# Patient Record
Sex: FEMALE | Race: WHITE
[De-identification: names, ages, dates, MRNs, and addresses within clinical notes are randomized per-mention and may not be internally consistent; named-entity substitution may affect disease eponyms.]

---

## 2018-08-13 NOTE — EDM.PDOC
ED HPI GENERAL MEDICAL PROBLEM





- General


Chief Complaint: Gastrointestinal Problem


Stated Complaint: 9 WEEKS PREG AND VOMITING


Time Seen by Provider: 08/13/18 07:54


Source of Information: Reports: Patient, RN Notes Reviewed





- History of Present Illness


INITIAL COMMENTS - FREE TEXT/NARRATIVE: 





28-year-old female comes in with symptoms of nausea vomiting and stating that 

she is about 9 weeks pregnant. At this time she has no major abdominal pain or 

cramping. No vaginal bleeding or spotting at this time. She states her mouth 

does feel dry this morning and she does feel somewhat weak and lightheaded when 

standing or walking. She has been taking some Reglan intermittently to help 

with the nausea vomiting.





- Related Data


 Allergies











Allergy/AdvReac Type Severity Reaction Status Date / Time


 


No Known Allergies Allergy   Verified 08/13/18 07:41











Home Meds: 


 Home Meds





Metoclopramide [Reglan] 2 tsp PO Q6H 08/13/18 [History]











Past Medical History


Gastrointestinal History: Reports: Other (See Below)


Other Gastrointestinal History: hyperemesis


OB/GYN History: Reports: Pregnancy





Social & Family History





- Tobacco Use


Smoking Status *Q: Never Smoker





- Recreational Drug Use


Recreational Drug Use: No





ED ROS GENERAL





- Review of Systems


Review Of Systems: See Below


Constitutional: Denies: Fever, Chills, Diaphoresis


HEENT: Denies: Throat Pain


Respiratory: Denies: Shortness of Breath


Cardiovascular: Denies: Chest Pain


GI/Abdominal: Reports: Nausea, Vomiting.  Denies: Abdominal Pain, Diarrhea


Musculoskeletal: Reports: No Symptoms


Skin: Reports: No Symptoms


Neurological: Reports: Dizziness





ED EXAM PREGNANCY





- Physical Exam


Exam: See Below


General Appearance: Alert, No Apparent Distress


Throat/Mouth: Other (oral mucosa mildly dry)


Head: Atraumatic


Neck: Supple, Full Range of Motion


Respiratory/Chest: No Respiratory Distress, Lungs Clear, Normal Breath Sounds


Cardiovascular: Tachycardia


GI/Abdominal Exam: Soft, Non-Tender.  No: Guarding


Extremities: Normal Inspection


Neurological: Alert, Oriented, No Motor/Sensory Deficits


Skin Exam: Warm, Dry, Normal Color





Course





- Vital Signs


Last Recorded V/S: 


 Last Vital Signs











Temp  98.5 F   08/13/18 07:38


 


Pulse  141 H  08/13/18 07:38


 


Resp  20   08/13/18 07:38


 


BP  129/94 H  08/13/18 07:38


 


Pulse Ox  100   08/13/18 07:38














- Orders/Labs/Meds


Orders: 


 Active Orders 24 hr











 Category Date Time Status


 


 Peripheral IV Care [RC] .AS DIRECTED Care  08/13/18 08:00 Active


 


 UA W/MICROSCOPIC [URIN] Stat Lab  08/13/18 09:12 Ordered


 


 Potassium Chloride [KCl 10 MEQ in Water 100 ML] 10 meq Med  08/13/18 09:16 

Active





 Premix Bag 1 bag   





 IV ASDIRECTED   


 


 Sodium Chloride 0.9% [Normal Saline] 1,000 ml Med  08/13/18 08:00 Active





 IV ONETIME   


 


 Sodium Chloride 0.9% [Saline Flush] Med  08/13/18 08:00 Active





 10 ml FLUSH ASDIRECTED PRN   


 


 Peripheral IV Insertion Adult [OM.PC] Stat Oth  08/13/18 08:00 Ordered








 Medication Orders





Sodium Chloride (Normal Saline)  1,000 mls @ 999 mls/hr IV ONETIME ELINA


   Last Admin: 08/13/18 08:07  Dose: 999 mls/hr


Potassium Chloride 10 meq/ (Premix)  100 mls @ 50 mls/hr IV ASDIRECTED ONE


   Stop: 08/13/18 11:15


   Last Admin: 08/13/18 09:20 Dose:  Not Given


Sodium Chloride (Saline Flush)  10 ml FLUSH ASDIRECTED PRN


   PRN Reason: Keep Vein Open


   Last Admin: 08/13/18 08:07  Dose: 10 ml








Labs: 


 Laboratory Tests











  08/13/18 08/13/18 08/13/18 Range/Units





  07:50 07:50 07:50 


 


WBC  13.56 H    (3.98-10.04)  K/mm3


 


RBC  5.49 H    (3.98-5.22)  M/mm3


 


Hgb  15.5    (11.2-15.7)  gm/L


 


Hct  44.0    (34.1-44.9)  %


 


MCV  80.1    (79.4-94.8)  fl


 


MCH  28.2    (25.6-32.2)  pg


 


MCHC  35.2    (32.2-35.5)  g/dl


 


RDW Std Deviation  37.7    (36.4-46.3)  fL


 


Plt Count  317    (182-369)  K/mm3


 


MPV  12.7 H    (9.4-12.3)  fl


 


Neut % (Auto)  81.0 H    (34.0-71.1)  %


 


Lymph % (Auto)  10.9 L    (19.3-51.7)  %


 


Mono % (Auto)  7.3    (4.7-12.5)  %


 


Eos % (Auto)  0.3 L    (0.7-5.8)  


 


Baso % (Auto)  0.1    (0.1-1.2)  %


 


Neut # (Auto)  10.97 H    (1.56-6.13)  K/mm3


 


Lymph # (Auto)  1.48    (1.18-3.74)  K/mm3


 


Mono # (Auto)  0.99 H    (0.24-0.36)  K/mm3


 


Eos # (Auto)  0.04    (0.04-0.36)  K/mm3


 


Baso # (Auto)  0.02    (0.01-0.08)  K/mm3


 


Sodium   136   (136-145)  mEq/L


 


Potassium   2.6 L   (3.5-5.1)  mEq/L


 


Chloride   97 L   ()  mEq/L


 


Carbon Dioxide   22   (21-32)  mEq/L


 


Anion Gap   19.6 H   (5-15)  


 


BUN   22 H   (7-18)  mg/dL


 


Creatinine   1.5 H   (0.55-1.02)  mg/dL


 


Est Cr Clr Drug Dosing   44.95   mL/min


 


Estimated GFR (MDRD)   42   (>60)  mL/min


 


BUN/Creatinine Ratio   14.7   (14-18)  


 


Glucose   129 H   ()  mg/dL


 


Calcium   9.3   (8.5-10.1)  mg/dL


 


Total Bilirubin   1.2 H   (0.2-1.0)  mg/dL


 


AST   56 H   (15-37)  U/L


 


ALT   148 H   (14-59)  U/L


 


Alkaline Phosphatase   178 H   ()  U/L


 


Total Protein   9.1 H   (6.4-8.2)  g/dl


 


Albumin   4.0   (3.4-5.0)  g/dl


 


Globulin   5.1   gm/dL


 


Albumin/Globulin Ratio   0.8 L   (1-2)  


 


HCG, Qual    Positive H  (NEGATIVE)  


 


HCG, Quant   088826.0   mIU/mL


 


Urine Color     (Yellow)  


 


Urine Appearance     (Clear)  


 


Urine pH     (5.0-8.0)  


 


Ur Specific Gravity     (1.005-1.030)  


 


Urine Protein     (Negative)  


 


Urine Glucose (UA)     (Negative)  


 


Urine Ketones     (Negative)  


 


Urine Occult Blood     (Negative)  


 


Urine Nitrite     (Negative)  


 


Urine Bilirubin     (Negative)  


 


Urine Urobilinogen     (0.2-1.0)  


 


Ur Leukocyte Esterase     (Negative)  














  08/13/18 Range/Units





  09:12 


 


WBC   (3.98-10.04)  K/mm3


 


RBC   (3.98-5.22)  M/mm3


 


Hgb   (11.2-15.7)  gm/L


 


Hct   (34.1-44.9)  %


 


MCV   (79.4-94.8)  fl


 


MCH   (25.6-32.2)  pg


 


MCHC   (32.2-35.5)  g/dl


 


RDW Std Deviation   (36.4-46.3)  fL


 


Plt Count   (182-369)  K/mm3


 


MPV   (9.4-12.3)  fl


 


Neut % (Auto)   (34.0-71.1)  %


 


Lymph % (Auto)   (19.3-51.7)  %


 


Mono % (Auto)   (4.7-12.5)  %


 


Eos % (Auto)   (0.7-5.8)  


 


Baso % (Auto)   (0.1-1.2)  %


 


Neut # (Auto)   (1.56-6.13)  K/mm3


 


Lymph # (Auto)   (1.18-3.74)  K/mm3


 


Mono # (Auto)   (0.24-0.36)  K/mm3


 


Eos # (Auto)   (0.04-0.36)  K/mm3


 


Baso # (Auto)   (0.01-0.08)  K/mm3


 


Sodium   (136-145)  mEq/L


 


Potassium   (3.5-5.1)  mEq/L


 


Chloride   ()  mEq/L


 


Carbon Dioxide   (21-32)  mEq/L


 


Anion Gap   (5-15)  


 


BUN   (7-18)  mg/dL


 


Creatinine   (0.55-1.02)  mg/dL


 


Est Cr Clr Drug Dosing   mL/min


 


Estimated GFR (MDRD)   (>60)  mL/min


 


BUN/Creatinine Ratio   (14-18)  


 


Glucose   ()  mg/dL


 


Calcium   (8.5-10.1)  mg/dL


 


Total Bilirubin   (0.2-1.0)  mg/dL


 


AST   (15-37)  U/L


 


ALT   (14-59)  U/L


 


Alkaline Phosphatase   ()  U/L


 


Total Protein   (6.4-8.2)  g/dl


 


Albumin   (3.4-5.0)  g/dl


 


Globulin   gm/dL


 


Albumin/Globulin Ratio   (1-2)  


 


HCG, Qual   (NEGATIVE)  


 


HCG, Quant   mIU/mL


 


Urine Color  Dark yellow  (Yellow)  


 


Urine Appearance  Turbid H  (Clear)  


 


Urine pH  7.0  (5.0-8.0)  


 


Ur Specific Gravity  1.025  (1.005-1.030)  


 


Urine Protein  3+ H  (Negative)  


 


Urine Glucose (UA)  Negative  (Negative)  


 


Urine Ketones  2+ H  (Negative)  


 


Urine Occult Blood  3+ H  (Negative)  


 


Urine Nitrite  Negative  (Negative)  


 


Urine Bilirubin  2+ H  (Negative)  


 


Urine Urobilinogen  1.0  (0.2-1.0)  


 


Ur Leukocyte Esterase  3+ H  (Negative)  











Meds: 


Medications











Generic Name Dose Route Start Last Admin





  Trade Name Freq  PRN Reason Stop Dose Admin


 


Sodium Chloride  1,000 mls @ 999 mls/hr  08/13/18 08:00  08/13/18 08:07





  Normal Saline  IV   999 mls/hr





  ONETIME ELINA   Administration





     





     





     





     


 


Potassium Chloride 10 meq/  100 mls @ 50 mls/hr  08/13/18 09:16  08/13/18 09:20





  Premix  IV  08/13/18 11:15  Not Given





  ASDIRECTED ONE   





     





     





     





     


 


Sodium Chloride  10 ml  08/13/18 08:00  08/13/18 08:07





  Saline Flush  FLUSH   10 ml





  ASDIRECTED PRN   Administration





  Keep Vein Open   





     





     





     














Discontinued Medications














Generic Name Dose Route Start Last Admin





  Trade Name Freq  PRN Reason Stop Dose Admin


 


Ondansetron HCl  4 mg  08/13/18 08:00  08/13/18 08:07





  Zofran  IVPUSH  08/13/18 08:01  4 mg





  ONETIME ONE   Administration





     





     





     





     














- Re-Assessments/Exams


Free Text/Narrative Re-Assessment/Exam: 





08/13/18 09:30


potassium came back very low at 2.6. Chemistries did show that she was 

moderately dehydrated. We have given 1 L of normal saline. I did place an order 

for IV potassium but then when I went to visit with patient she states "I have 

to go, my right is coming now". She is refusing to allow us to give the IV 

potassium or further fluid. She states she will eat bananas to try work on 

getting that built up. I do now just have the preliminary dipstick on her urine 

but that is leukocyte positive, nitrite negative. Have encouraged her to work 

hard to take the antibiotic as prescribed. Discharge instructions as documented.





Departure





- Departure


Time of Disposition: 09:19


Disposition: Home, Self-Care 01


Condition: Fair


Clinical Impression: 


 First trimester pregnancy, Hyperemesis gravidarum, Hypokalemia








- Discharge Information


Instructions:  Hyperemesis Gravidarum, Hypokalemia


Referrals: 


PCP,None [Primary Care Provider] - 


Forms:  ED Department Discharge


Additional Instructions: 


Clear liquids, careful bland diet as tolerated. Continue with Reglan every 6-8 

hours as needed for severe nausea or vomiting.  Your potassium was very low 

today at 2.6 with normal range 3.5-5.0.  Bananas are a good source of 

potassium. Try eat at least 2 per day, preferably 3 per day if tolerated. You 

will be given a list of our OB/GYN providers at time of discharge. Call to set 

up your prenatal appointment. Ua does show some evidency of infection.  Try 

hard to take antibiotic as prescribed.  Discharge instructions as documented.





- My Orders


Last 24 Hours: 


My Active Orders





08/13/18 08:00


Peripheral IV Care [RC] .AS DIRECTED 


Sodium Chloride 0.9% [Normal Saline] 1,000 ml IV ONETIME 


Sodium Chloride 0.9% [Saline Flush]   10 ml FLUSH ASDIRECTED PRN 


Peripheral IV Insertion Adult [OM.PC] Stat 





08/13/18 09:12


UA W/MICROSCOPIC [URIN] Stat 





08/13/18 09:16


Potassium Chloride [KCl 10 MEQ in Water 100 ML] 10 meq   Premix Bag 1 bag IV 

ASDIRECTED 














- Assessment/Plan


Last 24 Hours: 


My Active Orders





08/13/18 08:00


Peripheral IV Care [RC] .AS DIRECTED 


Sodium Chloride 0.9% [Normal Saline] 1,000 ml IV ONETIME 


Sodium Chloride 0.9% [Saline Flush]   10 ml FLUSH ASDIRECTED PRN 


Peripheral IV Insertion Adult [OM.PC] Stat 





08/13/18 09:12


UA W/MICROSCOPIC [URIN] Stat 





08/13/18 09:16


Potassium Chloride [KCl 10 MEQ in Water 100 ML] 10 meq   Premix Bag 1 bag IV 

ASDIRECTED

## 2018-08-17 NOTE — EDM.PDOC
ED HPI GENERAL MEDICAL PROBLEM





- General


Chief Complaint: OB/GYN Problem


Stated Complaint: 9 WKS PG - VOMITING


Time Seen by Provider: 18 10:36


Source of Information: Reports: Patient, Old Records (ED 2018)


History Limitations: Reports: No Limitations





- History of Present Illness


INITIAL COMMENTS - FREE TEXT/NARRATIVE: 





Medical records indicate that the patient was seen in this ED on 2018 by 

Dr. CONCHA Elder, with a complaint of being 9 weeks pregnant, with nausea and 

emesis. No abdominal pain or vaginal bleeding. She stated that she felt 

lightheaded when she stood or walked. She stated that she had been taking 

Reglan. She was found to be tachycardic with a resting heart rate of 141, 

although she was not hypotensive. Workup included a CBC, CMP, qualitative and 

quantitative hCG, and a urinalysis. The patient's potassium returned at 

significantly depressed at 2.6. Her BUN/Cr were elevated at 22/1.5, and her 

blood glucose elevated 129. Her quantitative hCG was 176,402. Her urinalysis 

was remarkable for 3+ protein, 2+ bilirubin, 2+ ketones, 3+ occult blood, and 3

+ leukocyte esterase. The microscopic analysis had not returned before the 

patient decided to leave the ED. Reviewing her urinalysis at this time, she had 

20-30 RBCs, too numerous to count WBCs, 10-20 epithelial cells, and many 

bacteria. Dr. Elder have ordered IV fluid and IV potassium replacement, 

however, the patient informed Dr. Elder that she had to leave the ED, because 

her ride was coming, and refused IV potassium chloride and IV fluid.





The patient now returns with continuation of her nausea and emesis, stating 

that she knows that she made a mistake in leaving the ED.





She again states that she is about 9 weeks pregnant, with LMP 2018 (10 

weeks 2 days by dates),  Ab5. The patient states that she recently moved 

to this area from [place], [place], and that she did not receive obstetric 

care there, and has not established an Obstetrician here. The patient states 

that the Reglan that she has been taking was prescribed by an emergency 

physician in [place].











- Related Data


 Allergies











Allergy/AdvReac Type Severity Reaction Status Date / Time


 


No Known Allergies Allergy   Verified 18 07:41











Home Meds: 


 Home Meds





Metoclopramide [Reglan] 2 tsp PO Q6H PRN 18 [History]


Amoxicillin/Clavulanate K [Augmentin 600-42.9 MG/5 ML Susp] 5 ml PO Q12H #70 ml 

18 [Rx]


Ondansetron [Zofran ODT] 1 tab PO Q6H PRN #10 tab.dis 18 [Rx]











Past Medical History


OB/GYN History: Reports: Pregnancy





- Past Surgical History


Female  Surgical History: Reports: D&C (x 1), Other (See Below) (Cervical 

biopsy - benign)





Social & Family History





- Family History


Family Medical History: Noncontributory


Cardiac: Reports: CAD, High Cholesterol, Hypertension


Endocrine/Metabolic: Reports: Diabetes, type II





- Tobacco Use


Smoking Status *Q: Never Smoker





- Caffeine Use


Caffeine Use: Reports: Soda





- Alcohol Use


Alcohol Use History: No





- Recreational Drug Use


Recreational Drug Use: Yes


Recreational Drug Type: Reports: Marijuana/Hashish (does not recall last time 

smoked)





- Living Situation & Occupation


Living situation: Reports: , Other (With friends)


Occupation: Unemployed





ED ROS GENERAL





- Review of Systems


Review Of Systems: ROS reveals no pertinent complaints other than HPI.





ED EXAM PREGNANCY





- Physical Exam


Exam: See Below


Exam Limited By: No Limitations


General Appearance: Alert, WD/WN, No Apparent Distress


Eye Exam: Bilateral Eye: EOMI, Normal Inspection


Ears: Normal External Exam, Hearing Grossly Normal


Nose: Normal Inspection, No Blood


Throat/Mouth: Normal Inspection, Normal Lips, Normal Voice, No Airway Compromise

, Other (Poor dentition)


Head: Atraumatic, Normocephalic


Neck: Normal Inspection, Full Range of Motion


Respiratory/Chest: No Respiratory Distress, Lungs Clear, Normal Breath Sounds, 

No Accessory Muscle Use


Cardiovascular: Normal Peripheral Pulses, Regular Rate, Rhythm, No Edema, No 

Gallop, No JVD, No Murmur, No Rub


GI/Abdominal Exam: Normal Bowel Sounds, Soft, Non-Tender, No Organomegaly, No 

Distention, No Abnormal Bruit, No Mass


Rectal Exam: Deferred


Back Exam: Normal Inspection, Full Range of Motion.  No: CVA Tenderness (L), 

CVA Tenderness (R)


Extremities: Normal Inspection, Normal Range of Motion, No Pedal Edema, Normal 

Capillary Refill


Neurological: Alert, Oriented, Normal Cognition, No Motor/Sensory Deficits


Psychiatric: Normal Affect


Skin Exam: Warm, Dry, Intact, Normal Color, No Rash





Course





- Vital Signs


Last Recorded V/S: 


 Last Vital Signs











Temp  36.8 C   18 10:34


 


Pulse  116 H  18 10:34


 


Resp  20   18 10:34


 


BP  108/74   18 10:34


 


Pulse Ox  98   18 10:34








 





Orthostatic Blood Pressure [     117/68


Standing]                        


Orthostatic Blood Pressure [     114/70


Sitting]                         


Orthostatic Blood Pressure [     113/67


Supine]                          











- Orders/Labs/Meds


Orders: 


 Active Orders 24 hr











 Category Date Time Status


 


 Orthostatic Vital Signs [RC] STAT Care  18 10:58 Active


 


 Orthostatic Vital Signs [RC] STAT Care  18 15:00 Active


 


 CULTURE URINE [RM] Stat Lab  18 11:05 Received


 


 DRUG SCREEN, URINE [URCHEM] Stat Lab  18 11:05 Ordered


 


 UA W/MICROSCOPIC [URIN] Stat Lab  18 11:05 Ordered


 


 Sodium Chloride 0.9% [Normal Saline] 1,000 ml Med  18 12:45 Active





 IV ASDIRECTED   








 Medication Orders





Sodium Chloride (Normal Saline)  1,000 mls @ 500 mls/hr IV ASDIRECTED ELINA


   Last Admin: 18 12:49  Dose: 500 mls/hr








Labs: 


 Laboratory Tests











  18 Range/Units





  10:40 10:40 11:05 


 


WBC  10.82 H    (3.98-10.04)  K/mm3


 


RBC  5.79 H    (3.98-5.22)  M/mm3


 


Hgb  16.1 H    (11.2-15.7)  gm/L


 


Hct  46.2 H    (34.1-44.9)  %


 


MCV  79.8    (79.4-94.8)  fl


 


MCH  27.8    (25.6-32.2)  pg


 


MCHC  34.8    (32.2-35.5)  g/dl


 


RDW Std Deviation  37.1    (36.4-46.3)  fL


 


Plt Count  332    (182-369)  K/mm3


 


MPV  13.5 H    (9.4-12.3)  fl


 


Neutrophils % (Manual)  78 H    (40-60)  %


 


Band Neutrophils %  1    (0-10)  %


 


Lymphocytes % (Manual)  12 L    (20-40)  %


 


Atypical Lymphs %  0    %


 


Monocytes % (Manual)  8    (2-10)  %


 


Eosinophils % (Manual)  0 L    (0.7-5.8)  %


 


Basophils % (Manual)  1    (0.1-1.2)  


 


Platelet Estimate  Adequate    


 


RBC Morph Comment  Normal    


 


Sodium   134 L   (136-145)  mEq/L


 


Potassium   2.7 L   (3.5-5.1)  mEq/L


 


Chloride   89 L   ()  mEq/L


 


Carbon Dioxide   28   (21-32)  mEq/L


 


Anion Gap   19.7 H   (5-15)  


 


BUN   28 H   (7-18)  mg/dL


 


Creatinine   1.6 H   (0.55-1.02)  mg/dL


 


Est Cr Clr Drug Dosing   42.14   mL/min


 


Estimated GFR (MDRD)   39   (>60)  mL/min


 


BUN/Creatinine Ratio   17.5   (14-18)  


 


Glucose   135 H   ()  mg/dL


 


Calcium   10.0   (8.5-10.1)  mg/dL


 


Magnesium   2.9 H   (1.8-2.4)  mg/dl


 


Total Bilirubin   3.2 H   (0.2-1.0)  mg/dL


 


AST   119 H   (15-37)  U/L


 


ALT   269 H   (14-59)  U/L


 


Alkaline Phosphatase   199 H   ()  U/L


 


Total Protein   9.5 H   (6.4-8.2)  g/dl


 


Albumin   3.9   (3.4-5.0)  g/dl


 


Globulin   5.6   gm/dL


 


Albumin/Globulin Ratio   0.7 L   (1-2)  


 


Urine Color    Brown H  (Yellow)  


 


Urine Appearance    Turbid H  (Clear)  


 


Urine pH    6.0  (5.0-8.0)  


 


Ur Specific Gravity    1.025  (1.005-1.030)  


 


Urine Protein    3+ H  (Negative)  


 


Urine Glucose (UA)    Trace H  (Negative)  


 


Urine Ketones    2+ H  (Negative)  


 


Urine Occult Blood    3+ H  (Negative)  


 


Urine Nitrite    Negative  (Negative)  


 


Urine Bilirubin    3+ H  (Negative)  


 


Urine Urobilinogen    4.0 H  (0.2-1.0)  


 


Ur Leukocyte Esterase    3+ H  (Negative)  


 


Urine RBC    >100 H  (0-5)  /hpf


 


Urine WBC    Too numerous to cnt H  (0-5)  /hpf


 


Ur Epithelial Cells    0-5  (0-5)  /hpf


 


Urine Bacteria    Moderate H  (FEW)  /hpf


 


Urine Mucus    Not seen  (FEW)  /hpf


 


Urine Opiates Screen     (NEGATIVE)  


 


Ur Buprenorphine Scrn     (NEGATIVE)  


 


Ur Oxycodone Screen     (NEGATIVE)  


 


Urine Methadone Screen     (NEGATIVE)  


 


Ur Propoxyphene Screen     (NEGATIVE)  


 


Ur Barbiturates Screen     (NEGATIVE)  


 


Ur Tricyclics Screen     (NEGATIVE)  


 


Ur Phencyclidine Scrn     (NEGATIVE)  


 


Ur Amphetamine Screen     (NEGATIVE)  


 


U Methamphetamines Scrn     (NEGATIVE)  


 


U Benzodiazepines Scrn     (NEGATIVE)  


 


U Cocaine Metab Screen     (NEGATIVE)  


 


U Marijuana (THC) Screen     (NEGATIVE)  














  18 Range/Units





  11:05 14:00 18:10 


 


WBC     (3.98-10.04)  K/mm3


 


RBC     (3.98-5.22)  M/mm3


 


Hgb     (11.2-15.7)  gm/L


 


Hct     (34.1-44.9)  %


 


MCV     (79.4-94.8)  fl


 


MCH     (25.6-32.2)  pg


 


MCHC     (32.2-35.5)  g/dl


 


RDW Std Deviation     (36.4-46.3)  fL


 


Plt Count     (182-369)  K/mm3


 


MPV     (9.4-12.3)  fl


 


Neutrophils % (Manual)     (40-60)  %


 


Band Neutrophils %     (0-10)  %


 


Lymphocytes % (Manual)     (20-40)  %


 


Atypical Lymphs %     %


 


Monocytes % (Manual)     (2-10)  %


 


Eosinophils % (Manual)     (0.7-5.8)  %


 


Basophils % (Manual)     (0.1-1.2)  


 


Platelet Estimate     


 


RBC Morph Comment     


 


Sodium   137   (136-145)  mEq/L


 


Potassium   3.1 L  3.4 L  (3.5-5.1)  mEq/L


 


Chloride   99   ()  mEq/L


 


Carbon Dioxide   26   (21-32)  mEq/L


 


Anion Gap   15.1 H   (5-15)  


 


BUN   23 H   (7-18)  mg/dL


 


Creatinine   1.3 H   (0.55-1.02)  mg/dL


 


Est Cr Clr Drug Dosing   51.87   mL/min


 


Estimated GFR (MDRD)   50   (>60)  mL/min


 


BUN/Creatinine Ratio   17.7   (14-18)  


 


Glucose   112 H   ()  mg/dL


 


Calcium   8.3 L   (8.5-10.1)  mg/dL


 


Magnesium     (1.8-2.4)  mg/dl


 


Total Bilirubin     (0.2-1.0)  mg/dL


 


AST     (15-37)  U/L


 


ALT     (14-59)  U/L


 


Alkaline Phosphatase     ()  U/L


 


Total Protein     (6.4-8.2)  g/dl


 


Albumin     (3.4-5.0)  g/dl


 


Globulin     gm/dL


 


Albumin/Globulin Ratio     (1-2)  


 


Urine Color     (Yellow)  


 


Urine Appearance     (Clear)  


 


Urine pH     (5.0-8.0)  


 


Ur Specific Gravity     (1.005-1.030)  


 


Urine Protein     (Negative)  


 


Urine Glucose (UA)     (Negative)  


 


Urine Ketones     (Negative)  


 


Urine Occult Blood     (Negative)  


 


Urine Nitrite     (Negative)  


 


Urine Bilirubin     (Negative)  


 


Urine Urobilinogen     (0.2-1.0)  


 


Ur Leukocyte Esterase     (Negative)  


 


Urine RBC     (0-5)  /hpf


 


Urine WBC     (0-5)  /hpf


 


Ur Epithelial Cells     (0-5)  /hpf


 


Urine Bacteria     (FEW)  /hpf


 


Urine Mucus     (FEW)  /hpf


 


Urine Opiates Screen  Negative    (NEGATIVE)  


 


Ur Buprenorphine Scrn  Negative    (NEGATIVE)  


 


Ur Oxycodone Screen  Negative    (NEGATIVE)  


 


Urine Methadone Screen  Negative    (NEGATIVE)  


 


Ur Propoxyphene Screen  Negative    (NEGATIVE)  


 


Ur Barbiturates Screen  Negative    (NEGATIVE)  


 


Ur Tricyclics Screen  Negative    (NEGATIVE)  


 


Ur Phencyclidine Scrn  Negative    (NEGATIVE)  


 


Ur Amphetamine Screen  Negative    (NEGATIVE)  


 


U Methamphetamines Scrn  Negative    (NEGATIVE)  


 


U Benzodiazepines Scrn  Negative    (NEGATIVE)  


 


U Cocaine Metab Screen  Negative    (NEGATIVE)  


 


U Marijuana (THC) Screen  Presumptive positive H    (NEGATIVE)  











Meds: 


Medications











Generic Name Dose Route Start Last Admin





  Trade Name Freq  PRN Reason Stop Dose Admin


 


Sodium Chloride  1,000 mls @ 500 mls/hr  18 12:45  18 12:49





  Normal Saline  IV   500 mls/hr





  ASDIRECTED ELINA   Administration





     





     





     





     














Discontinued Medications














Generic Name Dose Route Start Last Admin





  Trade Name Freq  PRN Reason Stop Dose Admin


 


Amoxicillin/Clavulanate Potassium  600 mg  18 12:17  18 12:41





  Augmentin 600-42.9 Mg/5 Ml Susp  PO  18 12:18  5 ml





  ONETIME STA   Administration





     





     





     





     


 


Sodium Chloride  1,000 mls @ 999 mls/hr  18 11:24  18 11:31





  Normal Saline  IV  18 12:24  999 mls/hr





  ONETIME ONE   Administration





     





     





     





     


 


Sodium Chloride  1,000 mls @ 999 mls/hr  18 15:10  18 15:25





  Normal Saline  IV  18 16:10  999 mls/hr





  ONETIME ONE   Administration





     





     





     





     


 


Ondansetron HCl  4 mg  18 11:24  18 11:31





  Zofran Odt  PO  18 11:25  4 mg





  ONETIME ONE   Administration





     





     





     





     


 


Ondansetron HCl  4 mg  18 15:30  18 15:40





  Zofran  IVPUSH  18 15:31  4 mg





  ONETIME ONE   Administration





     





     





     





     


 


Potassium Chloride  40 meq  18 11:31  18 12:28





  Klor-Con M20  PO  18 11:32  Not Given





  ONETIME STA   





     





     





     





     


 


Potassium Chloride  40 meq  18 12:15  18 12:24





  Potassium Chloride Solution  PO  18 12:16  40 meq





  ONETIME STA   Administration





     





     





     





     


 


Potassium Chloride  40 meq  18 15:02  18 15:28





  Potassium Chloride  PO  18 15:03  Not Given





  ONETIME STA   





     





     





     





     


 


Potassium Chloride  40 meq  18 15:20  18 15:25





  Potassium Chloride Solution  PO  18 15:21  40 meq





  ONETIME STA   Administration





     





     





     





     














- Re-Assessments/Exams


Free Text/Narrative Re-Assessment/Exam: 





18 11:25


It is unclear if the patient is orthostatic or not. Her BP did not register 

when standing, and Rosita ARRIAGA indicated that the patient was not cooperative. I 

have ordered 1 L IV fluid and 4 mg IV Zofran.








18 11:31


The patient's potassium has return significantly depressed at 2.7. Her 

magnesium is high at 2.9. Her BUN/Cr are elevated at 28/1.6. Her blood glucose 

is mildly elevated at 135. Her total bilirubin, AST, ALT, and alkaline 

phosphatase are all elevated.





I have ordered 40 mg oral potassium, however, if she is not able to keep this 

down, she will need to have IV replacement, which will require placement into 

observation.








18 12:18


Notified by Rosita ARRIAGA that the patient is unable to take pills due to a gag 

reflex. I switched her oral potassium to an oral solution.





The patient's urinalysis is consistent with a UTI. A urine culture has been 

ordered, and I will start the patient on Augmentin oral suspension in 

accordance with current guidelines for treatment of a UTI in a pregnant woman. 

She will need to be treated for 7 days.





The patient's urine drug screen returned positive for marijuana.








18 13:43


Notified by Rosita ARRIAGA that the patient has finished the oral potassium liquid. 

I have ordered a BMP for 14:15.








18 15:03


The patient's repeat chemistry panel finds her potassium to be up to 3.1, 

rising by 0.4. She needs another rise of 0.4, therefore I have ordered another 

40 mEq of oral potassium solution.





The patient has received 2 L of IV fluid. We are rechecking orthostatics.








18 15:10


The patient is still orthostatic after 2 L of IV fluid. I have ordered a third.








18 17:03


Following 3 L of IV fluid, the patient is no longer orthostatic.








18 18:51


Test results discussed with the patient. As above, the patient's hypokalemia 

has substantially improved, and she is no longer orthostatic. She states that 

she feels 100% better. I will discharge her home with prescriptions for 

Augmentin and Zofran. I will refer her to Dr. Pratt, who can address not only 

the patient's hypokalemia, renal insufficiency, and elevated LFTs, but also 

care for the patient's Obstetric needs.





Departure





- Departure


Time of Disposition: 18:52


Disposition: Home, Self-Care 01


Condition: Good


Clinical Impression: 


 Nausea & vomiting, Orthostasis, UTI (urinary tract infection), Hypokalemia, 

Renal insufficiency, Elevated LFTs, Pregnant








- Discharge Information


*PRESCRIPTION DRUG MONITORING PROGRAM REVIEWED*: Not Applicable


*COPY OF PRESCRIPTION DRUG MONITORING REPORT IN PATIENT IN[facility]: Not Applicable


Prescriptions: 


Amoxicillin/Clavulanate K [Augmentin 600-42.9 MG/5 ML Susp] 5 ml PO Q12H #70 ml


Ondansetron [Zofran ODT] 1 tab PO Q6H PRN #10 tab.dis


 PRN Reason: Nausea/Vomiting


Referrals: 


PCP,None [Primary Care Provider] - 


Rachael Pratt MD [Physician] - 


Forms:  ED Department Discharge


Additional Instructions: 


You were seen in the emergency room for nausea, vomiting, with previously 

diagnosed hypokalemia and renal insufficiency.





Workup in the ER included blood work, a urinalysis, a urine drug screen, and 

positional blood pressure checks.





You were found to be significantly volume depleted. You received 3 L of IV 

fluid in the ER, which corrected your volume depletion.





Your potassium was found to be very low at 2.7. You received a total of 40 mEq 

of oral potassium, which raised it to 3.4.





Your kidney function was found to be impaired. This improved with IV fluid, but 

will need time to completely correct.





You were found to have a urinary tract infection. You have been started on the 

antibiotic Augmentin. A prescription for Augmentin has been provided to you. 

Take 5 mL every 12 hours, starting tomorrow morning, Saturday, 2018, as 

prescribed. Finish the entire prescription unless told otherwise by a doctor.





A sample of your urine has been sent for culture. Results should be available 

by Monday, 2018 or Tuesday, 2018.





Your liver enzymes were found to be elevated. The cause is not known, but 

further evaluation is needed.





A prescription for the anti-nausea medicine Zofran has been provided. Dissolve 

1 tablet on your tongue up to every 8 hours, as needed for nausea/vomiting.





Stay well hydrated. Gatorade or Powerade are best.





Please follow-up with Dr. Rachael Pratt as a primary care physician, not only to 

check on your potassium, kidney function, and liver function, but also to check 

on your urine culture results, and to manage your obstetric care.





If any other problems, please do not hesitate to return to the ER.








Contact [facility]:


[address] [place] [address]


[phone]





- My Orders


Last 24 Hours: 


My Active Orders





18 10:58


Orthostatic Vital Signs [RC] STAT 





18 11:05


CULTURE URINE [RM] Stat 


DRUG SCREEN, URINE [URCHEM] Stat 


UA W/MICROSCOPIC [URIN] Stat 





18 12:45


Sodium Chloride 0.9% [Normal Saline] 1,000 ml IV ASDIRECTED 





18 15:00


Orthostatic Vital Signs [RC] STAT 














- Assessment/Plan


Last 24 Hours: 


My Active Orders





18 10:58


Orthostatic Vital Signs [RC] STAT 





18 11:05


CULTURE URINE [RM] Stat 


DRUG SCREEN, URINE [URCHEM] Stat 


UA W/MICROSCOPIC [URIN] Stat 





18 12:45


Sodium Chloride 0.9% [Normal Saline] 1,000 ml IV ASDIRECTED 





18 15:00


Orthostatic Vital Signs [RC] STAT

## 2018-08-30 NOTE — EDM.PDOC
ED HPI GENERAL MEDICAL PROBLEM





- General


Chief Complaint: Gastrointestinal Problem


Stated Complaint: VOMITING


Time Seen by Provider: 18 08:45


Source of Information: Reports: Patient


History Limitations: Reports: No Limitations





- History of Present Illness


INITIAL COMMENTS - FREE TEXT/NARRATIVE: 





The patient presents with nausea and vomiting.  She is 12 weeks pregnant with a 

LNMP of .  She is .  She has had many miscarriages.  She has been 

having nausea and vomiting with this pregnancy.  She was seen here and given 

zofran.  She ran out.  She has no abdominal pain.  She has no cramping, 

bleeding or discharge.  She has no dysuria or hematuria.  Dr Pratt is her OB 

doctor.  She has not seen her yet.


Onset: Gradual


Duration: Day(s):


Severity: Moderate


Improves with: Reports: None


Worsens with: Reports: None


Associated Symptoms: Reports: Nausea/Vomiting.  Denies: Chest Pain, Cough, Fever

/Chills, Headaches, Shortness of Breath





- Related Data


 Allergies











Allergy/AdvReac Type Severity Reaction Status Date / Time


 


strawberry Allergy  Rash Verified 18 08:45











Home Meds: 


 Home Meds





Ondansetron [Zofran ODT] 1 tab PO Q6H PRN #10 tab.dis 18 [Rx]


Nitrofurantoin Monohyd/M-Cryst [Macrobid 100 mg Capsule] 100 mg PO BID #10 

capsule 18 [Rx]


Ondansetron [Zofran ODT] 4 mg PO Q6H PRN #20 tab.dis 18 [Rx]











Past Medical History


Gastrointestinal History: Reports: Other (See Below)


Other Gastrointestinal History: hyperemesis


Genitourinary History: Reports: UTI, Recurrent


OB/GYN History: Reports: Pregnancy


Psychiatric History: Reports: Anxiety





- Past Surgical History


Female  Surgical History: Reports: D&C





Social & Family History





- Family History


Family Medical History: Noncontributory


Cardiac: Reports: CAD, High Cholesterol, Hypertension


Endocrine/Metabolic: Reports: Diabetes, type II





- Tobacco Use


Smoking Status *Q: Never Smoker





- Caffeine Use


Caffeine Use: Reports: None





- Recreational Drug Use


Recreational Drug Use: No





- Living Situation & Occupation


Living situation: Reports: , Other (With friends)


Occupation: Unemployed





ED ROS GENERAL





- Review of Systems


Review Of Systems: See Below


Constitutional: Reports: No Symptoms


HEENT: Reports: No Symptoms


Respiratory: Reports: No Symptoms


Cardiovascular: Reports: No Symptoms


Endocrine: Reports: No Symptoms


GI/Abdominal: Reports: Nausea, Vomiting.  Denies: Abdominal Pain, Diarrhea


: Reports: No Symptoms





ED EXAM PREGNANCY





- Physical Exam


Exam: See Below


Exam Limited By: No Limitations


General Appearance: Alert, No Apparent Distress


Ears: Normal External Exam


Nose: Normal Inspection


Head: Atraumatic, Normocephalic


Neck: Normal Inspection


Respiratory/Chest: No Respiratory Distress, Lungs Clear, Normal Breath Sounds


Cardiovascular: Regular Rate, Rhythm, No Edema, No Murmur


GI/Abdominal Exam: Soft, Non-Tender, No Organomegaly





Course





- Vital Signs


Last Recorded V/S: 





 Last Vital Signs











Temp  97.4 F   18 08:40


 


Pulse  113 H  18 08:40


 


Resp  13   18 08:40


 


BP  121/59 L  18 08:40


 


Pulse Ox  100   18 08:40








 





Orthostatic Blood Pressure [     117/70


Standing]                        


Orthostatic Blood Pressure [     113/71


Sitting]                         


Orthostatic Blood Pressure [     112/70


Supine]                          











- Orders/Labs/Meds


Orders: 





 Active Orders 24 hr











 Category Date Time Status


 


 Peripheral IV Care [RC] .AS DIRECTED Care  18 08:57 Active


 


 UA W/MICROSCOPIC [URIN] Stat Lab  18 10:34 Ordered


 


 Sodium Chloride 0.9% [Saline Flush] Med  18 08:57 Active





 10 ml FLUSH ASDIRECTED PRN   


 


 ED Antiemetic Medication Reflex [OM.PC] Stat Oth  18 08:57 Ordered


 


 Peripheral IV Insertion Adult [OM.PC] Stat Oth  18 08:57 Ordered








 Medication Orders





Sodium Chloride (Saline Flush)  10 ml FLUSH ASDIRECTED PRN


   PRN Reason: Keep Vein Open


   Last Admin: 18 09:15  Dose: 10 ml








Labs: 





 Laboratory Tests











  18 Range/Units





  09:15 09:15 10:34 


 


WBC  11.92 H    (3.98-10.04)  K/mm3


 


RBC  5.29 H    (3.98-5.22)  M/mm3


 


Hgb  15.0    (11.2-15.7)  gm/L


 


Hct  42.5    (34.1-44.9)  %


 


MCV  80.3    (79.4-94.8)  fl


 


MCH  28.4    (25.6-32.2)  pg


 


MCHC  35.3    (32.2-35.5)  g/dl


 


RDW Std Deviation  37.7    (36.4-46.3)  fL


 


Plt Count  336    (182-369)  K/mm3


 


MPV  11.6    (9.4-12.3)  fl


 


Neut % (Auto)  81.6 H    (34.0-71.1)  %


 


Lymph % (Auto)  12.1 L    (19.3-51.7)  %


 


Mono % (Auto)  5.5    (4.7-12.5)  %


 


Eos % (Auto)  0.2 L    (0.7-5.8)  


 


Baso % (Auto)  0.2    (0.1-1.2)  %


 


Neut # (Auto)  9.74 H    (1.56-6.13)  K/mm3


 


Lymph # (Auto)  1.44    (1.18-3.74)  K/mm3


 


Mono # (Auto)  0.65 H    (0.24-0.36)  K/mm3


 


Eos # (Auto)  0.02 L    (0.04-0.36)  K/mm3


 


Baso # (Auto)  0.02    (0.01-0.08)  K/mm3


 


Sodium   135 L   (136-145)  mEq/L


 


Potassium   3.3 L   (3.5-5.1)  mEq/L


 


Chloride   98   ()  mEq/L


 


Carbon Dioxide   16 L   (21-32)  mEq/L


 


Anion Gap   24.3 H   (5-15)  


 


BUN   12   (7-18)  mg/dL


 


Creatinine   1.1 H   (0.55-1.02)  mg/dL


 


Est Cr Clr Drug Dosing   61.30   mL/min


 


Estimated GFR (MDRD)   > 60   (>60)  mL/min


 


BUN/Creatinine Ratio   10.9 L   (14-18)  


 


Glucose   108 H   ()  mg/dL


 


Calcium   10.0   (8.5-10.1)  mg/dL


 


Total Bilirubin   1.7 H   (0.2-1.0)  mg/dL


 


AST   35   (15-37)  U/L


 


ALT   72 H   (14-59)  U/L


 


Alkaline Phosphatase   156 H   ()  U/L


 


Total Protein   8.8 H   (6.4-8.2)  g/dl


 


Albumin   3.8   (3.4-5.0)  g/dl


 


Globulin   5.0   gm/dL


 


Albumin/Globulin Ratio   0.8 L   (1-2)  


 


Lipase   325   ()  U/L


 


Urine Color    Yellow  (Yellow)  


 


Urine Appearance    Slt cloudy H  (Clear)  


 


Urine pH    6.0  (5.0-8.0)  


 


Ur Specific Gravity    > or = 1.030  (1.005-1.030)  


 


Urine Protein    3+ H  (Negative)  


 


Urine Glucose (UA)    Negative  (Negative)  


 


Urine Ketones    3+ H  (Negative)  


 


Urine Occult Blood    3+ H  (Negative)  


 


Urine Nitrite    Negative  (Negative)  


 


Urine Bilirubin    2+ H  (Negative)  


 


Urine Urobilinogen    2.0 H  (0.2-1.0)  


 


Ur Leukocyte Esterase    3+ H  (Negative)  


 


Urine RBC    5-10 H  (0-5)  /hpf


 


Urine WBC    Too numerous to cnt H  (0-5)  /hpf


 


Ur Epithelial Cells    0-5  (0-5)  /hpf


 


Urine Bacteria    Few  (FEW)  /hpf


 


Urine Mucus    Few  (FEW)  /hpf











Meds: 





Medications











Generic Name Dose Route Start Last Admin





  Trade Name Freq  PRN Reason Stop Dose Admin


 


Sodium Chloride  10 ml  18 08:57  18 09:15





  Saline Flush  FLUSH   10 ml





  ASDIRECTED PRN   Administration





  Keep Vein Open   





     





     





     














Discontinued Medications














Generic Name Dose Route Start Last Admin





  Trade Name Freq  PRN Reason Stop Dose Admin


 


Sodium Chloride  2,000 mls @ 1,000 mls/hr  18 08:57  18 09:15





  Normal Saline  IV  18 10:56  1,000 mls/hr





  .BOLUS STA   Administration





     





     





     





     


 


Ondansetron HCl  4 mg  18 08:57  18 09:15





  Zofran  IVPUSH  18 08:58  4 mg





  ONETIME ONE   Administration





     





     





     





     














- Re-Assessments/Exams


Free Text/Narrative Re-Assessment/Exam: 





18 11:53


I ordered 2L of NS, labs, UA and zofran 4mg IV.  Her WBC was slightly elevated 

at 11.92.  Her Hgb was elevated at 15.  Her sodium was 135.  Her K was a little 

low at 3.3 but improved from the last 2 visits.  Her creatinine was elevated 

slightly at 1.1.  Her Total bili was elevated at 1.7 but improved from the 

other visits.  Her ALT was elevated at 72.  Her Alk Phos was elevated at 156.  

Her lipase was normal.  Her UA shows a UTI.  She does feel better.  I will give 

her a prescription for zofran and get her on some macrobid.





Departure





- Departure


Time of Disposition: 11:55


Disposition: Home, Self-Care 01


Condition: Good


Clinical Impression: 


 First trimester pregnancy, Hyperemesis gravidarum





UTI (urinary tract infection)


Qualifiers:


 Urinary tract infection type: site unspecified Hematuria presence: without 

hematuria Qualified Code(s): N39.0 - Urinary tract infection, site not specified





Nausea & vomiting


Qualifiers:


 Vomiting type: unspecified Vomiting Intractability: non-intractable Qualified 

Code(s): R11.2 - Nausea with vomiting, unspecified








- Discharge Information


*PRESCRIPTION DRUG MONITORING PROGRAM REVIEWED*: No


*COPY OF PRESCRIPTION DRUG MONITORING REPORT IN PATIENT INNA: No


Prescriptions: 


Nitrofurantoin Monohyd/M-Cryst [Macrobid 100 mg Capsule] 100 mg PO BID #10 

capsule


Ondansetron [Zofran ODT] 4 mg PO Q6H PRN #20 tab.dis


 PRN Reason: Nausea\vomiting


Referrals: 


Rachael Pratt MD [Primary Care Provider] - 1 Week


Additional Instructions: 


Drink plenty of fluids.  Take the macrobid 2 times per day for 5 days.  Take 

the zofran every 6 hours as needed for nausea and vomiting.  Follow up with Dr Pratt early next week.  Please return if you are worse.





- My Orders


Last 24 Hours: 





My Active Orders





18 08:57


Peripheral IV Care [RC] .AS DIRECTED 


Sodium Chloride 0.9% [Saline Flush]   10 ml FLUSH ASDIRECTED PRN 


ED Antiemetic Medication Reflex [OM.PC] Stat 


Peripheral IV Insertion Adult [OM.PC] Stat 





18 10:34


UA W/MICROSCOPIC [URIN] Stat 














- Assessment/Plan


Last 24 Hours: 





My Active Orders





18 08:57


Peripheral IV Care [RC] .AS DIRECTED 


Sodium Chloride 0.9% [Saline Flush]   10 ml FLUSH ASDIRECTED PRN 


ED Antiemetic Medication Reflex [OM.PC] Stat 


Peripheral IV Insertion Adult [OM.PC] Stat 





08/30/18 10:34


UA W/MICROSCOPIC [URIN] Stat

## 2018-09-03 ENCOUNTER — HOSPITAL ENCOUNTER (EMERGENCY)
Dept: HOSPITAL 41 - JD.ED | Age: 28
Discharge: HOME | End: 2018-09-03
Payer: SELF-PAY

## 2018-09-03 DIAGNOSIS — O99.281: ICD-10-CM

## 2018-09-03 DIAGNOSIS — Z91.018: ICD-10-CM

## 2018-09-03 DIAGNOSIS — E87.6: ICD-10-CM

## 2018-09-03 DIAGNOSIS — O21.0: Primary | ICD-10-CM

## 2018-09-03 DIAGNOSIS — Z3A.12: ICD-10-CM

## 2018-09-03 DIAGNOSIS — O23.41: ICD-10-CM

## 2018-09-03 DIAGNOSIS — Z79.899: ICD-10-CM

## 2018-09-03 PROCEDURE — 83690 ASSAY OF LIPASE: CPT

## 2018-09-03 PROCEDURE — 96361 HYDRATE IV INFUSION ADD-ON: CPT

## 2018-09-03 PROCEDURE — 76801 OB US < 14 WKS SINGLE FETUS: CPT

## 2018-09-03 PROCEDURE — 96374 THER/PROPH/DIAG INJ IV PUSH: CPT

## 2018-09-03 PROCEDURE — 84702 CHORIONIC GONADOTROPIN TEST: CPT

## 2018-09-03 PROCEDURE — 85025 COMPLETE CBC W/AUTO DIFF WBC: CPT

## 2018-09-03 PROCEDURE — 81001 URINALYSIS AUTO W/SCOPE: CPT

## 2018-09-03 PROCEDURE — 80053 COMPREHEN METABOLIC PANEL: CPT

## 2018-09-03 PROCEDURE — 99284 EMERGENCY DEPT VISIT MOD MDM: CPT

## 2018-09-03 PROCEDURE — 36415 COLL VENOUS BLD VENIPUNCTURE: CPT

## 2018-09-03 NOTE — EDM.PDOC
ED HPI GENERAL MEDICAL PROBLEM





- General


Chief Complaint: Gastrointestinal Problem


Stated Complaint: VOMITING


Time Seen by Provider: 18 09:29


Source of Information: Reports: Patient


History Limitations: Reports: No Limitations





- History of Present Illness


INITIAL COMMENTS - FREE TEXT/NARRATIVE: 





The patient presents with nausea and vomiting and she is 12 weeks pregnant.  

She has a LNMP of .  She is .  She has no cramping or spotting.  

This has been a problem with all of her pregnancies but this nausea and 

vomiting is lasting longer.  She has no pain but she cannot keep anything down.

  She has no fever or chills.  She has no dysuria.  She has been here about 4 

times and she was found to have a UTI.  She is having a hard time keeping her 

antibiotic down.


Onset: Gradual


Duration: Week(s):


Severity: Moderate


Improves with: Reports: None


Worsens with: Reports: None


Associated Symptoms: Reports: Nausea/Vomiting.  Denies: Chest Pain, Cough, Fever

/Chills, Headaches, Loss of Appetite, Shortness of Breath





- Related Data


 Allergies











Allergy/AdvReac Type Severity Reaction Status Date / Time


 


strawberry Allergy  Rash Verified 18 09:32











Home Meds: 


 Home Meds





Ondansetron [Zofran ODT] 1 tab PO Q6H PRN #10 tab.dis 18 [Rx]


Nitrofurantoin Monohyd/M-Cryst [Macrobid 100 mg Capsule] 100 mg PO BID #10 

capsule 18 [Rx]


Potassium Chloride 20 meq PO DAILY #300 ml 18 [Rx]











Past Medical History





- Past Health History


Medical/Surgical History: Denies Medical/Surgical History


Gastrointestinal History: Reports: Other (See Below)


Other Gastrointestinal History: hyperemesis


Genitourinary History: Reports: UTI, Recurrent


OB/GYN History: Reports: Pregnancy


Psychiatric History: Reports: Anxiety





- Past Surgical History


Female  Surgical History: Reports: D&C





Social & Family History





- Family History


Family Medical History: Noncontributory


Cardiac: Reports: CAD, High Cholesterol, Hypertension


Endocrine/Metabolic: Reports: Diabetes, type II





- Tobacco Use


Smoking Status *Q: Never Smoker





- Caffeine Use


Caffeine Use: Reports: None





- Recreational Drug Use


Recreational Drug Use: No





- Living Situation & Occupation


Living situation: Reports: , Other (With friends)


Occupation: Unemployed





ED ROS GENERAL





- Review of Systems


Review Of Systems: See Below


Constitutional: Reports: No Symptoms


HEENT: Reports: No Symptoms


Respiratory: Reports: No Symptoms


Cardiovascular: Reports: No Symptoms


Endocrine: Reports: No Symptoms


GI/Abdominal: Reports: Nausea, Vomiting.  Denies: Abdominal Pain


: Reports: No Symptoms


Musculoskeletal: Reports: No Symptoms


Skin: Reports: No Symptoms





ED EXAM PREGNANCY





- Physical Exam


Exam: See Below


Exam Limited By: No Limitations


General Appearance: Alert, No Apparent Distress


Ears: Normal External Exam


Nose: Normal Inspection


Head: Atraumatic, Normocephalic


Neck: Normal Inspection


Respiratory/Chest: No Respiratory Distress, Lungs Clear, Normal Breath Sounds


Cardiovascular: Regular Rate, Rhythm, No Edema, No Murmur


GI/Abdominal Exam: Soft, Non-Tender, Other (gravid uterus above the pubic 

symphysis)





Course





- Vital Signs


Last Recorded V/S: 


 Last Vital Signs











Temp  97.8 F   18 09:28


 


Pulse  120 H  18 09:28


 


Resp  18   18 09:28


 


BP  128/103 H  18 09:28


 


Pulse Ox  99   18 09:28














- Orders/Labs/Meds


Orders: 


 Active Orders 24 hr











 Category Date Time Status


 


 Peripheral IV Care [RC] .AS DIRECTED Care  18 09:52 Active


 


 OB 1st Tri Sgl 1st Gest [US] Stat Exams  18 11:22 Taken


 


 UA W/MICROSCOPIC [URIN] Stat Lab  18 12:40 Ordered


 


 Sodium Chloride 0.9% [Saline Flush] Med  18 09:51 Active





 10 ml FLUSH ASDIRECTED PRN   


 


 ED Antiemetic Medication Reflex [OM.PC] Stat Oth  18 09:52 Ordered


 


 Peripheral IV Insertion Adult [OM.PC] Stat Oth  18 09:51 Ordered








 Medication Orders





Sodium Chloride (Saline Flush)  10 ml FLUSH ASDIRECTED PRN


   PRN Reason: Keep Vein Open


   Last Admin: 18 10:23  Dose: 10 ml








Labs: 


 Laboratory Tests











  18 Range/Units





  10:13 10:13 10:13 


 


WBC  9.51    (3.98-10.04)  K/mm3


 


RBC  5.32 H    (3.98-5.22)  M/mm3


 


Hgb  15.1    (11.2-15.7)  gm/L


 


Hct  41.7    (34.1-44.9)  %


 


MCV  78.4 L    (79.4-94.8)  fl


 


MCH  28.4    (25.6-32.2)  pg


 


MCHC  36.2 H    (32.2-35.5)  g/dl


 


RDW Std Deviation  38.7    (36.4-46.3)  fL


 


Plt Count  301    (182-369)  K/mm3


 


MPV  12.7 H    (9.4-12.3)  fl


 


Neut % (Auto)  79.2 H    (34.0-71.1)  %


 


Lymph % (Auto)  14.9 L    (19.3-51.7)  %


 


Mono % (Auto)  5.3    (4.7-12.5)  %


 


Eos % (Auto)  0.2 L    (0.7-5.8)  


 


Baso % (Auto)  0.2    (0.1-1.2)  %


 


Neut # (Auto)  7.53 H    (1.56-6.13)  K/mm3


 


Lymph # (Auto)  1.42    (1.18-3.74)  K/mm3


 


Mono # (Auto)  0.50 H    (0.24-0.36)  K/mm3


 


Eos # (Auto)  0.02 L    (0.04-0.36)  K/mm3


 


Baso # (Auto)  0.02    (0.01-0.08)  K/mm3


 


Sodium   140   (136-145)  mEq/L


 


Potassium   2.9 L   (3.5-5.1)  mEq/L


 


Chloride   102   ()  mEq/L


 


Carbon Dioxide   19 L   (21-32)  mEq/L


 


Anion Gap   21.9 H   (5-15)  


 


BUN   10   (7-18)  mg/dL


 


Creatinine   0.9   (0.55-1.02)  mg/dL


 


Est Cr Clr Drug Dosing   74.92   mL/min


 


Estimated GFR (MDRD)   > 60   (>60)  mL/min


 


BUN/Creatinine Ratio   11.1 L   (14-18)  


 


Glucose   125 H   ()  mg/dL


 


Calcium   10.0   (8.5-10.1)  mg/dL


 


Total Bilirubin   1.3 H   (0.2-1.0)  mg/dL


 


AST   32   (15-37)  U/L


 


ALT   74 H   (14-59)  U/L


 


Alkaline Phosphatase   161 H   ()  U/L


 


Total Protein   8.4 H   (6.4-8.2)  g/dl


 


Albumin   3.5   (3.4-5.0)  g/dl


 


Globulin   4.9   gm/dL


 


Albumin/Globulin Ratio   0.7 L   (1-2)  


 


Lipase   339   ()  U/L


 


HCG, Quant    39652.0  mIU/mL


 


Urine Color     (Yellow)  


 


Urine Appearance     (Clear)  


 


Urine pH     (5.0-8.0)  


 


Ur Specific Gravity     (1.005-1.030)  


 


Urine Protein     (Negative)  


 


Urine Glucose (UA)     (Negative)  


 


Urine Ketones     (Negative)  


 


Urine Occult Blood     (Negative)  


 


Urine Nitrite     (Negative)  


 


Urine Bilirubin     (Negative)  


 


Urine Urobilinogen     (0.2-1.0)  


 


Ur Leukocyte Esterase     (Negative)  


 


Urine RBC     (0-5)  /hpf


 


Urine WBC     (0-5)  /hpf


 


Ur Epithelial Cells     (0-5)  /hpf


 


Urine Bacteria     (FEW)  /hpf


 


Urine Mucus     (FEW)  /hpf














  18 Range/Units





  12:40 


 


WBC   (3.98-10.04)  K/mm3


 


RBC   (3.98-5.22)  M/mm3


 


Hgb   (11.2-15.7)  gm/L


 


Hct   (34.1-44.9)  %


 


MCV   (79.4-94.8)  fl


 


MCH   (25.6-32.2)  pg


 


MCHC   (32.2-35.5)  g/dl


 


RDW Std Deviation   (36.4-46.3)  fL


 


Plt Count   (182-369)  K/mm3


 


MPV   (9.4-12.3)  fl


 


Neut % (Auto)   (34.0-71.1)  %


 


Lymph % (Auto)   (19.3-51.7)  %


 


Mono % (Auto)   (4.7-12.5)  %


 


Eos % (Auto)   (0.7-5.8)  


 


Baso % (Auto)   (0.1-1.2)  %


 


Neut # (Auto)   (1.56-6.13)  K/mm3


 


Lymph # (Auto)   (1.18-3.74)  K/mm3


 


Mono # (Auto)   (0.24-0.36)  K/mm3


 


Eos # (Auto)   (0.04-0.36)  K/mm3


 


Baso # (Auto)   (0.01-0.08)  K/mm3


 


Sodium   (136-145)  mEq/L


 


Potassium   (3.5-5.1)  mEq/L


 


Chloride   ()  mEq/L


 


Carbon Dioxide   (21-32)  mEq/L


 


Anion Gap   (5-15)  


 


BUN   (7-18)  mg/dL


 


Creatinine   (0.55-1.02)  mg/dL


 


Est Cr Clr Drug Dosing   mL/min


 


Estimated GFR (MDRD)   (>60)  mL/min


 


BUN/Creatinine Ratio   (14-18)  


 


Glucose   ()  mg/dL


 


Calcium   (8.5-10.1)  mg/dL


 


Total Bilirubin   (0.2-1.0)  mg/dL


 


AST   (15-37)  U/L


 


ALT   (14-59)  U/L


 


Alkaline Phosphatase   ()  U/L


 


Total Protein   (6.4-8.2)  g/dl


 


Albumin   (3.4-5.0)  g/dl


 


Globulin   gm/dL


 


Albumin/Globulin Ratio   (1-2)  


 


Lipase   ()  U/L


 


HCG, Quant   mIU/mL


 


Urine Color  [place] H  (Yellow)  


 


Urine Appearance  Clear  (Clear)  


 


Urine pH  6.5  (5.0-8.0)  


 


Ur Specific Gravity  1.025  (1.005-1.030)  


 


Urine Protein  3+ H  (Negative)  


 


Urine Glucose (UA)  Negative  (Negative)  


 


Urine Ketones  4+ H  (Negative)  


 


Urine Occult Blood  2+ H  (Negative)  


 


Urine Nitrite  Negative  (Negative)  


 


Urine Bilirubin  2+ H  (Negative)  


 


Urine Urobilinogen  4.0 H  (0.2-1.0)  


 


Ur Leukocyte Esterase  3+ H  (Negative)  


 


Urine RBC  10-20 H  (0-5)  /hpf


 


Urine WBC  40-50 H  (0-5)  /hpf


 


Ur Epithelial Cells  0-5  (0-5)  /hpf


 


Urine Bacteria  Few  (FEW)  /hpf


 


Urine Mucus  Few  (FEW)  /hpf











Meds: 


Medications











Generic Name Dose Route Start Last Admin





  Trade Name Freq  PRN Reason Stop Dose Admin


 


Sodium Chloride  10 ml  18 09:51  18 10:23





  Saline Flush  FLUSH   10 ml





  ASDIRECTED PRN   Administration





  Keep Vein Open   





     





     





     














Discontinued Medications














Generic Name Dose Route Start Last Admin





  Trade Name Freq  PRN Reason Stop Dose Admin


 


Sodium Chloride  2,000 mls @ 1,000 mls/hr  18 09:51  18 11:31





  Normal Saline  IV  18 11:50  1,000 mls/hr





  .BOLUS STA   Infusion





     





     





     





     


 


Ondansetron HCl  4 mg  18 09:51  18 10:23





  Zofran  IVPUSH  18 09:52  4 mg





  ONETIME ONE   Administration





     





     





     





     














- Re-Assessments/Exams


Free Text/Narrative Re-Assessment/Exam: 





18 13:33


I ordered an IV NS 2L bolus, zofran 4mg IV, labs, UA and an US of her pelvis.  

Her CBC looks good.  Her K was low at 2.9.  Her anion gap was elevated at 21.9.

  Her glucose was 125.  Her total bili was 1.3.  Her ALT was elevated at 74.  

Her Alk Phos was elevated at 161.  Her lipase was normal.  Her HCG was 73,366.  

I initially did a bedside US and I could not see very good.  The official US 

did show a single living IUP.  Average crown rump length 5.9cm, corresponding 

to a gestational age of 11 weeks 6 days with estimated date of delivery of 3/19/

19.  FHT was 181.  She feels better.  She does not want any potassium here but 

she will take a prescription.  She will be seeing Dr Pratt on Tuesday.


18 13:37


She has an antibiotic at home for her UTI and she has enough zofran.  Dr Pratt 

may have a different plan so I will not give her any more.





Departure





- Departure


Time of Disposition: 13:37


Disposition: Home, Self-Care 01


Condition: Good


Clinical Impression: 


 Elevated LFTs, Hyperemesis gravidarum, Hypokalemia, First trimester pregnancy





Nausea & vomiting


Qualifiers:


 Vomiting type: unspecified Vomiting Intractability: non-intractable Qualified 

Code(s): R11.2 - Nausea with vomiting, unspecified





UTI (urinary tract infection)


Qualifiers:


 Urinary tract infection type: site unspecified Hematuria presence: without 

hematuria Qualified Code(s): N39.0 - Urinary tract infection, site not specified








- Discharge Information


*PRESCRIPTION DRUG MONITORING PROGRAM REVIEWED*: No


*COPY OF PRESCRIPTION DRUG MONITORING REPORT IN PATIENT INNA: No


Prescriptions: 


Potassium Chloride 20 meq PO DAILY #300 ml


Referrals: 


PCP,Not In Area [Primary Care Provider] - 


Rachael Pratt MD [Physician] - 


Forms:  ED Department Discharge


Additional Instructions: 


Take your antibiotic as prescribed.  Take the zofran as needed for nausea and 

vomiting.  Take the potassium 20meq daily until gone.  Follow up with Dr Pratt 

tomorrow.  Please return if you are worse.





- My Orders


Last 24 Hours: 


My Active Orders





18 09:51


Sodium Chloride 0.9% [Saline Flush]   10 ml FLUSH ASDIRECTED PRN 


Peripheral IV Insertion Adult [OM.PC] Stat 





18 09:52


Peripheral IV Care [RC] .AS DIRECTED 


ED Antiemetic Medication Reflex [OM.PC] Stat 





18 11:22


OB 1st Tri Sgl 1st Gest [US] Stat 





18 12:40


UA W/MICROSCOPIC [URIN] Stat 














- Assessment/Plan


Last 24 Hours: 


My Active Orders





18 09:51


Sodium Chloride 0.9% [Saline Flush]   10 ml FLUSH ASDIRECTED PRN 


Peripheral IV Insertion Adult [OM.PC] Stat 





18 09:52


Peripheral IV Care [RC] .AS DIRECTED 


ED Antiemetic Medication Reflex [OM.PC] Stat 





18 11:22


OB 1st Tri Sgl 1st Gest [US] Stat 





18 12:40


UA W/MICROSCOPIC [URIN] Stat